# Patient Record
Sex: FEMALE | Race: BLACK OR AFRICAN AMERICAN | NOT HISPANIC OR LATINO | Employment: PART TIME | URBAN - METROPOLITAN AREA
[De-identification: names, ages, dates, MRNs, and addresses within clinical notes are randomized per-mention and may not be internally consistent; named-entity substitution may affect disease eponyms.]

---

## 2017-04-15 ENCOUNTER — HOSPITAL ENCOUNTER (EMERGENCY)
Facility: HOSPITAL | Age: 47
Discharge: HOME | End: 2017-04-15
Payer: MEDICAID

## 2017-04-15 VITALS
OXYGEN SATURATION: 98 % | DIASTOLIC BLOOD PRESSURE: 82 MMHG | SYSTOLIC BLOOD PRESSURE: 162 MMHG | RESPIRATION RATE: 20 BRPM | HEIGHT: 63 IN | TEMPERATURE: 99 F | HEART RATE: 78 BPM | WEIGHT: 278 LBS | BODY MASS INDEX: 49.26 KG/M2

## 2017-04-15 DIAGNOSIS — M79.672 FOOT PAIN, LEFT: Primary | ICD-10-CM

## 2017-04-15 DIAGNOSIS — M72.2 PLANTAR FASCIITIS: ICD-10-CM

## 2017-04-15 PROCEDURE — 25000003 PHARM REV CODE 250: Performed by: NURSE PRACTITIONER

## 2017-04-15 PROCEDURE — 99283 EMERGENCY DEPT VISIT LOW MDM: CPT

## 2017-04-15 RX ORDER — DICLOFENAC POTASSIUM 50 MG/1
50 TABLET, FILM COATED ORAL 2 TIMES DAILY PRN
Qty: 20 TABLET | Refills: 0 | Status: SHIPPED | OUTPATIENT
Start: 2017-04-15 | End: 2017-12-18

## 2017-04-15 RX ORDER — DEXAMETHASONE 4 MG/1
4 TABLET ORAL DAILY
Qty: 5 TABLET | Refills: 0 | Status: SHIPPED | OUTPATIENT
Start: 2017-04-15 | End: 2017-04-20

## 2017-04-15 RX ORDER — HYDROCODONE BITARTRATE AND ACETAMINOPHEN 10; 325 MG/1; MG/1
1 TABLET ORAL
Status: COMPLETED | OUTPATIENT
Start: 2017-04-15 | End: 2017-04-15

## 2017-04-15 RX ADMIN — HYDROCODONE BITARTRATE AND ACETAMINOPHEN 1 TABLET: 10; 325 TABLET ORAL at 07:04

## 2017-04-15 NOTE — ED AVS SNAPSHOT
[facility]  [address]  [place] [address]               Becca Nunez   4/15/2017  7:22 PM   ED    Description:  Female : 1970   Department:  [facility]            Your Care was Coordinated By:     Provider Role From To    Darek Espino NP Nurse Practitioner 04/15/17 1922 --      Reason for Visit     Foot Pain           Diagnoses this Visit        Comments    Foot pain, left    -  Primary     Plantar fasciitis           ED Disposition     None           To Do List           Follow-up Information     Follow up with MIRNA Patel In 2 days.    Specialty:  Family Medicine    Contact information:    [address]  [address]  [facility]  [facility] [address]  [phone]         These Medications        Disp Refills Start End    diclofenac (CATAFLAM) 50 MG tablet 20 tablet 0 4/15/2017     Take 1 tablet (50 mg total) by mouth 2 (two) times daily as needed (pain). - Oral    dexamethasone (DECADRON) 4 MG Tab 5 tablet 0 4/15/2017 2017    Take 1 tablet (4 mg total) by mouth once daily. - Oral      [facility]     [facility] -  Assistance  Unless otherwise directed by your provider, please contact [facility], our nurse care line that is available for  assistance.     Registered nurses in the [facility] provide: appointment scheduling, clinical advisement, health education, and other advisory services.  Call: [phone] (toll free)               Medications           Message regarding Medications     Verify the changes and/or additions to your medication regime listed below are the same as discussed with your clinician today.  If any of these changes or additions are incorrect, please notify your healthcare provider.        START taking these NEW medications        Refills    dexamethasone (DECADRON) 4 MG Tab 0    Sig: Take 1 tablet (4 mg total) by mouth once  "daily.    Class: Print    Route: Oral      These medications were administered today        Dose Freq    hydrocodone-acetaminophen 10-325mg per tablet 1 tablet 1 tablet ED 1 Time    Sig: Take 1 tablet by mouth ED 1 Time.    Class: Normal    Route: Oral    Cosign for Ordering: Required by Gildardo Díaz Jr., MD      CHANGE how you are taking these medications     Start Taking Instead of    diclofenac (CATAFLAM) 50 MG tablet diclofenac (CATAFLAM) 50 MG tablet    Dosage:  Take 1 tablet (50 mg total) by mouth 2 (two) times daily as needed (pain). Dosage:  Take 50 mg by mouth 2 (two) times daily.           Verify that the below list of medications is an accurate representation of the medications you are currently taking.  If none reported, the list may be blank. If incorrect, please contact your healthcare provider. Carry this list with you in case of emergency.           Current Medications     amlodipine (NORVASC) 10 MG tablet Take 10 mg by mouth once daily.    atorvastatin (LIPITOR) 10 MG tablet Take 10 mg by mouth once daily.    gabapentin (NEURONTIN) 600 MG tablet Take 600 mg by mouth 3 (three) times daily.    metformin (FORTAMET) 500 mg 24 hr tablet Take 500 mg by mouth daily with breakfast.    metoprolol succinate (TOPROL-XL) 100 MG 24 hr tablet Take 100 mg by mouth 2 (two) times daily.    dexamethasone (DECADRON) 4 MG Tab Take 1 tablet (4 mg total) by mouth once daily.    diclofenac (CATAFLAM) 50 MG tablet Take 1 tablet (50 mg total) by mouth 2 (two) times daily as needed (pain).    naproxen (NAPROSYN) 500 MG tablet Take 1 tablet (500 mg total) by mouth 2 (two) times daily with meals.           Clinical Reference Information           Your Vitals Were     BP Pulse Temp Resp Height Weight    162/82 78 99.3 °F (37.4 °C) (Oral) 20 5' 3" (1.6 m) 126.1 kg (278 lb)    SpO2 BMI             98% 49.25 kg/m2         Allergies as of 4/15/2017     No Known Allergies      Immunizations Administered on Date of Encounter - " 4/15/2017     None      ED Micro, Lab, POCT     None      ED Imaging Orders     Start Ordered       Status Ordering Provider    04/15/17 1929 04/15/17 1929  X-Ray Foot Complete Left  1 time imaging      Final result         Discharge Instructions         Plantar Fasciitis  Plantar fasciitis is a painful swelling of the plantar fascia. The plantar fascia is a thick, fibrous layer of tissue. It covers the bones on the bottom of your foot. And it supports the foot bones in an arched position.  This can happen gradually or suddenly. It usually affects one foot at a time. Heel pain can be sharp, like a knife sticking into the bottom of your foot. You may feel pain after exercising, long-distance jogging, stair climbing, long periods of standing, or after standing up.  Risk factors include: non-active lifestyle, arthritis, diabetes, obesity or recent weight gain, flat foot, high arch. Wearing high heels, loose shoes, or shoes with poor arch support for long periods of time adds to the risk. This problem is commonly found in runners and dancers. It also found in people who stand on hard surfaces for long periods of time.  Foot pain from this condition is usually worse in the morning. But it improves with walking. By the end of the day there may be a dull aching. Treatment requires short-term rest and controlling swelling. It may take up to 9 months before all symptoms go away. Rarely, a steroid injection into the foot, or surgery, may be needed.  Home care  · If you are overweight, lose weight to help healing.  · Choose supportive shoes with good arch support and shock absorbency. Replace athletic shoes when they become worn out. Dont walk or run barefoot.  · Premade or custom-fitted shoe inserts may be helpful. Inserts made of silicone seem to be the most effective. Custom-made inserts can be provided by a podiatrist or foot specialist, physical therapist, or orthopedist.  · Premade or custom-made night splints keep the  heel stretched out while you sleep. They may prevent morning pain.  · Avoid activities that stress the feet: jogging, prolonged standing or walking, contact sports, etc.  · First thing in the morning and before sports, stretch the bottom of your feet. Gently flex your ankle so the toes move toward your knee.  · Icing may help control heel pain. Apply an ice pack to the heel for 10-20 minutes as a preventive. Or ice your heel after a severe flare-up of symptoms. You may repeat this every 1-2 hours as needed.  · You may use over-the-counter pain medicine to control pain, unless another medicine was prescribed. Anti-inflammatory pain medicines, such as ibuprofen or naproxen, may work better than acetaminophen. If you have chronic liver or kidney disease or ever had a stomach ulcer or GI bleeding, talk with your healthcare provider before using these medicines.  Follow-up care  Follow up with your healthcare provider, physical therapist, or podiatrist or foot specialist as advised.  Call for an appointment if pain worsens or there is no relief after a few weeks of home treatment. Shoe inserts, a night splint, or a special boot may be required.  If X-rays were taken, you will be told of any new findings that may affect your care.  When to seek medical advice  Call your healthcare provider right away if any of these occur:  · Foot swelling  · Redness with increasing pain  Date Last Reviewed: 11/21/2015  © 2711-1057 [employer]. [address] [place] [address], [place] [address]. All rights reserved. This information is not intended as a substitute for professional medical care. Always follow your healthcare professional's instructions.           [facility] complies with applicable Federal civil rights laws and does not discriminate on the basis of race, color, national origin, age, disability, or sex.        Language Assistance Services     ATTENTION: Language assistance services are available, free  of charge. Please call [phone].      ATENCIÓN: Si habla español, tiene a ramires disposición servicios gratuitos de asistencia lingüística. Llame al [phone].     CHÚ Ý: N?u b?n nói Ti?ng Vi?t, có các d?ch v? h? tr? ngôn ng? mi?n phí dành cho b?n. G?i s? [phone].

## 2017-04-16 NOTE — ED PROVIDER NOTES
SCRIBE #1 NOTE: I, Hermelinda Duvall, am scribing for, and in the presence of, Darek Espino NP. I have scribed the entire note.      History      Chief Complaint   Patient presents with    Foot Pain     reports pain to the left foot and heel x 1 week, denies any injury        Review of patient's allergies indicates:  No Known Allergies     HPI   HPI    4/15/2017, 7:26 PM   History obtained from the patient      History of Present Illness: Becca Nunez is a 46 y.o. female patient with PMHx of DM and HTN who presents to the Emergency Department for L foot pain which onset gradually a week ago. Pt denies injury. Symptoms are constant and moderate in severity. The patient describes the sxs as starting at the bottom of her foot and radiating up her leg. No mitigating or exacerbating factors reported. Associated sxs include L heel pain. Patient denies any fever, chills, increased swelling/redness/warmth to L foot, decreased ROM of foot, extremity weakness/numbness, and all other sxs at this time. No further complaints or concerns at this time.     Arrival mode: Personal vehicle      PCP: MIRNA Patel       Past Medical History:  Past Medical History:   Diagnosis Date    Diabetes mellitus     High cholesterol     Hypertension        Past Surgical History:  Past Surgical History:   Procedure Laterality Date     SECTION      TONSILLECTOMY           Family History:  History reviewed. No pertinent family history.    Social History:  Social History     Social History Main Topics    Smoking status: Never Smoker    Smokeless tobacco: Unknown    Alcohol use No    Drug use: No    Sexual activity: Yes     Partners: Male       ROS   Review of Systems   Constitutional: Negative for chills and fever.   HENT: Negative for sore throat.    Respiratory: Negative for shortness of breath.    Cardiovascular: Negative for chest pain.   Gastrointestinal: Negative for nausea.   Genitourinary: Negative for dysuria.  "  Musculoskeletal: Negative for back pain.        (+) L foot pain, L heel pain  (-) increased swelling/redness/warmth to L foot, decreased ROM of foot   Skin: Negative for rash.   Neurological: Negative for weakness and numbness.   Hematological: Does not bruise/bleed easily.   All other systems reviewed and are negative.      Physical Exam    Initial Vitals   BP Pulse Resp Temp SpO2   04/15/17 1916 04/15/17 1916 04/15/17 1916 04/15/17 1916 04/15/17 1916   160/83 79 18 99.3 °F (37.4 °C) 97 %      Physical Exam  Nursing Notes and Vital Signs Reviewed.  Constitutional: Patient is in no acute distress. Awake and alert. Well-developed and well-nourished.  Head: Atraumatic. Normocephalic.  Eyes: PERRL. EOM intact. Conjunctivae are not pale. No scleral icterus.  ENT: Mucous membranes are moist. Oropharynx is clear and symmetric.    Neck: Supple. Full ROM. No lymphadenopathy.  Cardiovascular: Regular rate. Regular rhythm. No murmurs, rubs, or gallops. Distal pulses are 2+ and symmetric.  Pulmonary/Chest: No respiratory distress. Clear to auscultation bilaterally. No wheezing, rales, or rhonchi.  Abdominal: Soft and non-distended.  There is no tenderness.  No rebound, guarding, or rigidity. Good bowel sounds.  Genitourinary: No CVA tenderness  Musculoskeletal: Moves all extremities. No obvious deformities. No edema. No calf tenderness. L planar foot tenderness.  Skin: Warm and dry.  Neurological:  Alert, awake, and appropriate.  Normal speech.  No acute focal neurological deficits are appreciated.  Psychiatric: Normal affect. Good eye contact. Appropriate in content.        ED Course    Procedures  ED Vital Signs:  Vitals:    04/15/17 1916 04/15/17 2035   BP: (!) 160/83 (!) 162/82   Pulse: 79 78   Resp: 18 20   Temp: 99.3 °F (37.4 °C)    TempSrc: Oral    SpO2: 97% 98%   Weight: 126.1 kg (278 lb)    Height: 5' 3" (1.6 m)        Abnormal Lab Results:  Labs Reviewed - No data to display     All Lab Results:      Imaging " Results:  Imaging Results         X-Ray Foot Complete Left (Final result) Result time:  04/15/17 20:37:58    Final result by Maurisio Yang MD (04/15/17 20:37:58)    Impression:         Negative.        Electronically signed by: MAURISIO YANG MD  Date:     04/15/17  Time:    20:37     Narrative:    Exam: XR FOOT COMPLETE 3 VIEW LEFT    Clinical History:    Pain in the left foot.    Findings:      No osseous, articular, or soft tissue abnormality demonstrated.                      The Emergency Provider reviewed the vital signs and test results, which are outlined above.    ED Discussion     8:50 PM: Reassessed pt at this time. Discussed with pt all pertinent ED information and results. Discussed pt dx of left foot pain and plan of tx. Gave pt all f/u and return to the ED instructions. All questions and concerns were addressed at this time. Pt expresses understanding of information and instructions, and is comfortable with plan to discharge. Pt is stable for discharge.    Pre-hypertension/Hypertension: The pt has been informed that they may have pre-hypertension or hypertension based on a blood pressure reading in the ED. I recommend that the pt call the PCP listed on their discharge instructions or a physician of their choice this week to arrange f/u for further evaluation of possible pre-hypertension or hypertension.     I discussed with patient and/or family/caretaker that evaluation in the ED does not suggest any emergent or life threatening medical conditions requiring immediate intervention beyond what was provided in the ED, and I believe patient is safe for discharge.  Regardless, an unremarkable evaluation in the ED does not preclude the development or presence of a serious of life threatening condition. As such, patient was instructed to return immediately for any worsening or change in current symptoms.    ED Medication(s):  Medications   hydrocodone-acetaminophen 10-325mg per tablet 1 tablet (1 tablet Oral  Given 4/15/17 1937)       Discharge Medication List as of 4/15/2017  8:50 PM      START taking these medications    Details   dexamethasone (DECADRON) 4 MG Tab Take 1 tablet (4 mg total) by mouth once daily., Starting 4/15/2017, Until Thu 4/20/17, Print             Follow-up Information     Follow up with MIRNA Patel In 2 days.    Specialty:  Family Medicine    Contact information:    [address]  [address]  [facility]  [place] [address]  [phone]              Medical Decision Making    Medical Decision Making:   Clinical Tests:   Radiological Study: Ordered and Reviewed           Scribe Attestation:   Scribe #1: I performed the above scribed service and the documentation accurately describes the services I performed. I attest to the accuracy of the note.    Attending:   Physician Attestation Statement for Scribe #1: I, Darek Espino NP, personally performed the services described in this documentation, as scribed by Hermelinda Duvall, in my presence, and it is both accurate and complete.          Clinical Impression       ICD-10-CM ICD-9-CM   1. Foot pain, left M79.672 729.5   2. Plantar fasciitis M72.2 728.71       Disposition:   Disposition: Discharged  Condition: Stable         Darek Espino NP  04/16/17 0131

## 2017-04-16 NOTE — DISCHARGE INSTRUCTIONS
Plantar Fasciitis  Plantar fasciitis is a painful swelling of the plantar fascia. The plantar fascia is a thick, fibrous layer of tissue. It covers the bones on the bottom of your foot. And it supports the foot bones in an arched position.  This can happen gradually or suddenly. It usually affects one foot at a time. Heel pain can be sharp, like a knife sticking into the bottom of your foot. You may feel pain after exercising, long-distance jogging, stair climbing, long periods of standing, or after standing up.  Risk factors include: non-active lifestyle, arthritis, diabetes, obesity or recent weight gain, flat foot, high arch. Wearing high heels, loose shoes, or shoes with poor arch support for long periods of time adds to the risk. This problem is commonly found in runners and dancers. It also found in people who stand on hard surfaces for long periods of time.  Foot pain from this condition is usually worse in the morning. But it improves with walking. By the end of the day there may be a dull aching. Treatment requires short-term rest and controlling swelling. It may take up to 9 months before all symptoms go away. Rarely, a steroid injection into the foot, or surgery, may be needed.  Home care  · If you are overweight, lose weight to help healing.  · Choose supportive shoes with good arch support and shock absorbency. Replace athletic shoes when they become worn out. Dont walk or run barefoot.  · Premade or custom-fitted shoe inserts may be helpful. Inserts made of silicone seem to be the most effective. Custom-made inserts can be provided by a podiatrist or foot specialist, physical therapist, or orthopedist.  · Premade or custom-made night splints keep the heel stretched out while you sleep. They may prevent morning pain.  · Avoid activities that stress the feet: jogging, prolonged standing or walking, contact sports, etc.  · First thing in the morning and before sports, stretch the bottom of your feet.  Gently flex your ankle so the toes move toward your knee.  · Icing may help control heel pain. Apply an ice pack to the heel for 10-20 minutes as a preventive. Or ice your heel after a severe flare-up of symptoms. You may repeat this every 1-2 hours as needed.  · You may use over-the-counter pain medicine to control pain, unless another medicine was prescribed. Anti-inflammatory pain medicines, such as ibuprofen or naproxen, may work better than acetaminophen. If you have chronic liver or kidney disease or ever had a stomach ulcer or GI bleeding, talk with your healthcare provider before using these medicines.  Follow-up care  Follow up with your healthcare provider, physical therapist, or podiatrist or foot specialist as advised.  Call for an appointment if pain worsens or there is no relief after a few weeks of home treatment. Shoe inserts, a night splint, or a special boot may be required.  If X-rays were taken, you will be told of any new findings that may affect your care.  When to seek medical advice  Call your healthcare provider right away if any of these occur:  · Foot swelling  · Redness with increasing pain  Date Last Reviewed: 11/21/2015  © 0615-4864 [employer]. [address] [place] [address], [place] [address]. All rights reserved. This information is not intended as a substitute for professional medical care. Always follow your healthcare professional's instructions.